# Patient Record
Sex: MALE | Race: WHITE | NOT HISPANIC OR LATINO | URBAN - METROPOLITAN AREA
[De-identification: names, ages, dates, MRNs, and addresses within clinical notes are randomized per-mention and may not be internally consistent; named-entity substitution may affect disease eponyms.]

---

## 2021-03-18 ENCOUNTER — APPOINTMENT (RX ONLY)
Dept: RURAL CLINIC 4 | Facility: CLINIC | Age: 71
Setting detail: DERMATOLOGY
End: 2021-03-18

## 2021-03-18 VITALS — TEMPERATURE: 97.1 F

## 2021-03-18 DIAGNOSIS — L57.8 OTHER SKIN CHANGES DUE TO CHRONIC EXPOSURE TO NONIONIZING RADIATION: ICD-10-CM | Status: STABLE

## 2021-03-18 DIAGNOSIS — L81.4 OTHER MELANIN HYPERPIGMENTATION: ICD-10-CM

## 2021-03-18 DIAGNOSIS — L91.8 OTHER HYPERTROPHIC DISORDERS OF THE SKIN: ICD-10-CM

## 2021-03-18 DIAGNOSIS — L30.4 ERYTHEMA INTERTRIGO: ICD-10-CM

## 2021-03-18 DIAGNOSIS — L82.1 OTHER SEBORRHEIC KERATOSIS: ICD-10-CM

## 2021-03-18 DIAGNOSIS — L82.0 INFLAMED SEBORRHEIC KERATOSIS: ICD-10-CM

## 2021-03-18 DIAGNOSIS — D22 MELANOCYTIC NEVI: ICD-10-CM

## 2021-03-18 DIAGNOSIS — D18.0 HEMANGIOMA: ICD-10-CM

## 2021-03-18 PROBLEM — D18.01 HEMANGIOMA OF SKIN AND SUBCUTANEOUS TISSUE: Status: ACTIVE | Noted: 2021-03-18

## 2021-03-18 PROBLEM — D22.5 MELANOCYTIC NEVI OF TRUNK: Status: ACTIVE | Noted: 2021-03-18

## 2021-03-18 PROCEDURE — 99204 OFFICE O/P NEW MOD 45 MIN: CPT | Mod: 25

## 2021-03-18 PROCEDURE — ? COUNSELING

## 2021-03-18 PROCEDURE — 11200 RMVL SKIN TAGS UP TO&INC 15: CPT | Mod: 59

## 2021-03-18 PROCEDURE — 17110 DESTRUCTION B9 LES UP TO 14: CPT

## 2021-03-18 PROCEDURE — ? PRESCRIPTION

## 2021-03-18 PROCEDURE — ? SKIN TAG REMOVAL

## 2021-03-18 PROCEDURE — ? PRESCRIPTION MEDICATION MANAGEMENT

## 2021-03-18 PROCEDURE — ? LIQUID NITROGEN

## 2021-03-18 PROCEDURE — ? SUNSCREEN RECOMMENDATIONS

## 2021-03-18 RX ORDER — CICLOPIROX OLAMINE 7.7 MG/G
THIN LAYER CREAM TOPICAL BID
Qty: 1 | Refills: 0 | Status: ERX | COMMUNITY
Start: 2021-03-18

## 2021-03-18 RX ADMIN — CICLOPIROX OLAMINE THIN LAYER: 7.7 CREAM TOPICAL at 00:00

## 2021-03-18 ASSESSMENT — LOCATION SIMPLE DESCRIPTION DERM
LOCATION SIMPLE: RIGHT UPPER BACK
LOCATION SIMPLE: RIGHT AXILLARY VAULT
LOCATION SIMPLE: RIGHT CLAVICULAR SKIN
LOCATION SIMPLE: LEFT FOREHEAD
LOCATION SIMPLE: LEFT FOREARM
LOCATION SIMPLE: LEFT AXILLARY VAULT
LOCATION SIMPLE: LEFT UPPER BACK
LOCATION SIMPLE: RIGHT CHEEK

## 2021-03-18 ASSESSMENT — LOCATION DETAILED DESCRIPTION DERM
LOCATION DETAILED: RIGHT CLAVICULAR SKIN
LOCATION DETAILED: LEFT PROXIMAL DORSAL FOREARM
LOCATION DETAILED: LEFT AXILLARY VAULT
LOCATION DETAILED: RIGHT AXILLARY VAULT
LOCATION DETAILED: LEFT INFERIOR MEDIAL FOREHEAD
LOCATION DETAILED: RIGHT INFERIOR CENTRAL MALAR CHEEK
LOCATION DETAILED: RIGHT MID-UPPER BACK
LOCATION DETAILED: LEFT MID-UPPER BACK
LOCATION DETAILED: RIGHT INFERIOR UPPER BACK

## 2021-03-18 ASSESSMENT — LOCATION ZONE DERM
LOCATION ZONE: AXILLAE
LOCATION ZONE: ARM
LOCATION ZONE: TRUNK
LOCATION ZONE: FACE

## 2021-03-18 NOTE — PROCEDURE: SKIN TAG REMOVAL
Medical Necessity Clause: This procedure was medically necessary because the lesions that were treated were:
Anesthesia Volume In Cc: 3
Add Associated Diagnoses If Applicable When Selecting Medical Necessity: Yes
Medical Necessity Information: It is in your best interest to select a reason for this procedure from the list below. All of these items fulfill various CMS LCD requirements except the new and changing color options.
Consent: Written consent obtained and the risks of skin tag removal was reviewed with the patient including but not limited to bleeding, pigmentary change, infection, pain, and remote possibility of scarring.
Include Z78.9 (Other Specified Conditions Influencing Health Status) As An Associated Diagnosis?: No
Detail Level: Detailed

## 2021-03-18 NOTE — PROCEDURE: LIQUID NITROGEN
Medical Necessity Clause: This procedure was medically necessary because the lesions that were treated were:  Inflamed and irritating and not healing
Detail Level: Simple
Include Z78.9 (Other Specified Conditions Influencing Health Status) As An Associated Diagnosis?: No
Aperture Size (Optional): C
Number Of Freeze-Thaw Cycles: 2 freeze-thaw cycles
Consent: The patient's consent was obtained including but not limited to risks of crusting, scabbing, blistering, scarring, darker or lighter pigmentary change, recurrence, incomplete removal and infection.
Render Post-Care Instructions In Note?: yes
Medical Necessity Information: It is in your best interest to select a reason for this procedure from the list below. All of these items fulfill various CMS LCD requirements except the new and changing color options.
Duration Of Freeze Thaw-Cycle (Seconds): 3
Post-Care Instructions: I reviewed with the patient in detail post-care instructions. Patient is to wear sunprotection, and avoid picking at any of the treated lesions. Pt may apply Rubbing alcohol to treated areas two times per day for two days.

## 2021-03-18 NOTE — PROCEDURE: PRESCRIPTION MEDICATION MANAGEMENT
Render In Strict Bullet Format?: No
Initiate Treatment: Ciclopirox to apply bid to rash until healed.
Detail Level: Zone

## 2021-04-15 ENCOUNTER — APPOINTMENT (RX ONLY)
Dept: RURAL CLINIC 4 | Facility: CLINIC | Age: 71
Setting detail: DERMATOLOGY
End: 2021-04-15

## 2021-04-15 VITALS — TEMPERATURE: 97.2 F

## 2021-04-15 DIAGNOSIS — L30.4 ERYTHEMA INTERTRIGO: ICD-10-CM

## 2021-04-15 PROCEDURE — ? PRESCRIPTION

## 2021-04-15 PROCEDURE — 99214 OFFICE O/P EST MOD 30 MIN: CPT

## 2021-04-15 PROCEDURE — ? COUNSELING

## 2021-04-15 PROCEDURE — ? PRESCRIPTION MEDICATION MANAGEMENT

## 2021-04-15 RX ORDER — HYDROCORTISONE 25 MG/G
A THIN LAYER CREAM TOPICAL BID
Qty: 1 | Refills: 1 | Status: ERX | COMMUNITY
Start: 2021-04-15

## 2021-04-15 RX ADMIN — HYDROCORTISONE A THIN LAYER: 25 CREAM TOPICAL at 00:00

## 2021-04-15 ASSESSMENT — LOCATION DETAILED DESCRIPTION DERM: LOCATION DETAILED: LEFT AXILLARY VAULT

## 2021-04-15 ASSESSMENT — LOCATION ZONE DERM: LOCATION ZONE: AXILLAE

## 2021-04-15 ASSESSMENT — LOCATION SIMPLE DESCRIPTION DERM: LOCATION SIMPLE: LEFT AXILLARY VAULT

## 2021-04-15 NOTE — PROCEDURE: PRESCRIPTION MEDICATION MANAGEMENT
Render In Strict Bullet Format?: No
Initiate Treatment: Hydrocortisone BID for 1 week.
Detail Level: Zone
Continue Regimen: Ciclopirox to apply bid to rash until healed. Cassie Coleman

## 2022-06-27 ENCOUNTER — NEW PATIENT (OUTPATIENT)
Dept: URBAN - METROPOLITAN AREA CLINIC 47 | Facility: CLINIC | Age: 72
End: 2022-06-27

## 2022-06-27 DIAGNOSIS — H25.812: ICD-10-CM

## 2022-06-27 DIAGNOSIS — H44.23: ICD-10-CM

## 2022-06-27 DIAGNOSIS — H52.7: ICD-10-CM

## 2022-06-27 DIAGNOSIS — Z96.1: ICD-10-CM

## 2022-06-27 DIAGNOSIS — Z98.890: ICD-10-CM

## 2022-06-27 PROCEDURE — 92015 DETERMINE REFRACTIVE STATE: CPT

## 2022-06-27 PROCEDURE — 92004 COMPRE OPH EXAM NEW PT 1/>: CPT

## 2022-06-27 ASSESSMENT — TONOMETRY
OD_IOP_MMHG: 16
OS_IOP_MMHG: 16

## 2022-06-27 ASSESSMENT — VISUAL ACUITY
OD_SC: <J12
OS_SC: 20/200
OS_CC: 20/25-1
OD_SC: 20/100-1
OS_SC: J2
OD_CC: 20/40+2

## 2022-07-18 ENCOUNTER — CONSULTATION/EVALUATION (OUTPATIENT)
Dept: URBAN - METROPOLITAN AREA CLINIC 43 | Facility: CLINIC | Age: 72
End: 2022-07-18

## 2022-07-18 DIAGNOSIS — H44.23: ICD-10-CM

## 2022-07-18 PROCEDURE — 99214 OFFICE O/P EST MOD 30 MIN: CPT

## 2022-07-18 PROCEDURE — 92250 FUNDUS PHOTOGRAPHY W/I&R: CPT

## 2022-07-18 ASSESSMENT — TONOMETRY
OS_IOP_MMHG: 18
OD_IOP_MMHG: 15

## 2022-07-18 ASSESSMENT — VISUAL ACUITY
OD_CC: 20/50-1
OS_CC: 20/70-1

## 2022-12-19 ENCOUNTER — PREPPED CHART (OUTPATIENT)
Dept: URBAN - METROPOLITAN AREA CLINIC 47 | Facility: CLINIC | Age: 72
End: 2022-12-19

## 2023-01-17 ENCOUNTER — COMPREHENSIVE EXAM (OUTPATIENT)
Dept: URBAN - METROPOLITAN AREA CLINIC 47 | Facility: CLINIC | Age: 73
End: 2023-01-17

## 2023-01-17 DIAGNOSIS — H44.23: ICD-10-CM

## 2023-01-17 DIAGNOSIS — H43.393: ICD-10-CM

## 2023-01-17 DIAGNOSIS — H35.363: ICD-10-CM

## 2023-01-17 DIAGNOSIS — Z96.1: ICD-10-CM

## 2023-01-17 DIAGNOSIS — H25.812: ICD-10-CM

## 2023-01-17 DIAGNOSIS — H43.813: ICD-10-CM

## 2023-01-17 DIAGNOSIS — H35.30: ICD-10-CM

## 2023-01-17 PROCEDURE — 92014 COMPRE OPH EXAM EST PT 1/>: CPT

## 2023-01-17 ASSESSMENT — VISUAL ACUITY
OS_BAT: 20/50
OS_CC: 20/30
OD_BAT: 20/150
OS_SC: J2
OS_CC: J5
OS_SC: 20/400
OD_SC: 20/100-
OD_CC: <J12
OD_CC: 20/30
OD_SC: J10

## 2023-01-17 ASSESSMENT — TONOMETRY
OS_IOP_MMHG: 18
OD_IOP_MMHG: 16

## 2023-05-26 ENCOUNTER — TECH ONLY (OUTPATIENT)
Dept: URBAN - METROPOLITAN AREA CLINIC 39 | Facility: CLINIC | Age: 73
End: 2023-05-26

## 2023-05-26 ENCOUNTER — SURGERY/PROCEDURE (OUTPATIENT)
Dept: URBAN - METROPOLITAN AREA CLINIC 43 | Facility: CLINIC | Age: 73
End: 2023-05-26

## 2023-05-26 ENCOUNTER — PRE-OP/H&P (OUTPATIENT)
Dept: URBAN - METROPOLITAN AREA SURGERY 14 | Facility: SURGERY | Age: 73
End: 2023-05-26

## 2023-05-26 DIAGNOSIS — Z96.1: ICD-10-CM

## 2023-05-26 DIAGNOSIS — H35.30: ICD-10-CM

## 2023-05-26 DIAGNOSIS — H44.23: ICD-10-CM

## 2023-05-26 DIAGNOSIS — Z98.890: ICD-10-CM

## 2023-05-26 DIAGNOSIS — H25.812: ICD-10-CM

## 2023-05-26 DIAGNOSIS — H01.006: ICD-10-CM

## 2023-05-26 DIAGNOSIS — H04.123: ICD-10-CM

## 2023-05-26 DIAGNOSIS — H43.813: ICD-10-CM

## 2023-05-26 DIAGNOSIS — H43.393: ICD-10-CM

## 2023-05-26 DIAGNOSIS — H35.363: ICD-10-CM

## 2023-05-26 DIAGNOSIS — H01.003: ICD-10-CM

## 2023-05-26 PROCEDURE — 99211T TECH SERVICE

## 2023-05-26 PROCEDURE — 99211HP H&P OFFICE/OUTPATIENT VISIT, EST

## 2023-05-26 PROCEDURE — 66984 XCAPSL CTRC RMVL W/O ECP: CPT

## 2023-05-26 ASSESSMENT — TONOMETRY: OS_IOP_MMHG: 15

## 2023-05-26 ASSESSMENT — VISUAL ACUITY
OS_SC: CF 6FT
OS_SC: J4

## 2023-05-30 ENCOUNTER — POST-OP (OUTPATIENT)
Dept: URBAN - METROPOLITAN AREA CLINIC 45 | Facility: CLINIC | Age: 73
End: 2023-05-30

## 2023-05-30 DIAGNOSIS — Z96.1: ICD-10-CM

## 2023-05-30 ASSESSMENT — VISUAL ACUITY
OS_SC: 20/100
OS_SC: J1

## 2023-05-30 ASSESSMENT — TONOMETRY: OS_IOP_MMHG: 24

## 2023-06-15 ENCOUNTER — POST-OP (OUTPATIENT)
Dept: URBAN - METROPOLITAN AREA CLINIC 47 | Facility: CLINIC | Age: 73
End: 2023-06-15

## 2023-06-15 DIAGNOSIS — H04.123: ICD-10-CM

## 2023-06-15 DIAGNOSIS — Z96.1: ICD-10-CM

## 2023-06-15 PROCEDURE — 99024 POSTOP FOLLOW-UP VISIT: CPT

## 2023-06-15 ASSESSMENT — VISUAL ACUITY
OS_SC: 20/80-
OS_SC: J1
OD_SC: 20/100-
OU_SC: J1
OU_SC: 20/80-
OD_SC: <J12

## 2023-06-15 ASSESSMENT — TONOMETRY
OS_IOP_MMHG: 18
OD_IOP_MMHG: 16

## 2023-10-17 ENCOUNTER — FOLLOW UP (OUTPATIENT)
Dept: URBAN - METROPOLITAN AREA CLINIC 47 | Facility: CLINIC | Age: 73
End: 2023-10-17

## 2023-10-17 DIAGNOSIS — H04.123: ICD-10-CM

## 2023-10-17 DIAGNOSIS — Z96.1: ICD-10-CM

## 2023-10-17 DIAGNOSIS — H26.492: ICD-10-CM

## 2023-10-17 DIAGNOSIS — H01.026: ICD-10-CM

## 2023-10-17 DIAGNOSIS — H01.023: ICD-10-CM

## 2023-10-17 DIAGNOSIS — H44.23: ICD-10-CM

## 2023-10-17 PROCEDURE — 99213 OFFICE O/P EST LOW 20 MIN: CPT

## 2023-10-17 PROCEDURE — 92134 CPTRZ OPH DX IMG PST SGM RTA: CPT

## 2023-10-17 ASSESSMENT — VISUAL ACUITY
OD_BAT: 20/40
OU_CC: 20/20
OS_BAT: 20/25
OS_CC: 20/20
OD_CC: 20/30

## 2023-10-17 ASSESSMENT — TONOMETRY
OS_IOP_MMHG: 15
OD_IOP_MMHG: 18

## 2023-10-24 ENCOUNTER — CONSULTATION/EVALUATION (OUTPATIENT)
Dept: URBAN - METROPOLITAN AREA CLINIC 36 | Facility: CLINIC | Age: 73
End: 2023-10-24

## 2023-10-24 DIAGNOSIS — H01.026: ICD-10-CM

## 2023-10-24 DIAGNOSIS — Z96.1: ICD-10-CM

## 2023-10-24 DIAGNOSIS — H26.492: ICD-10-CM

## 2023-10-24 DIAGNOSIS — H01.023: ICD-10-CM

## 2023-10-24 DIAGNOSIS — H44.23: ICD-10-CM

## 2023-10-24 DIAGNOSIS — H04.123: ICD-10-CM

## 2023-10-24 PROCEDURE — 66821 AFTER CATARACT LASER SURGERY: CPT

## 2023-10-24 PROCEDURE — 99214 OFFICE O/P EST MOD 30 MIN: CPT | Mod: 57

## 2023-10-24 ASSESSMENT — VISUAL ACUITY
OS_CC: 20/30-1
OD_PH: 20/50
OS_PH: 20/40
OD_CC: 20/40

## 2023-10-24 ASSESSMENT — TONOMETRY
OS_IOP_MMHG: 16
OD_IOP_MMHG: 17

## 2023-10-31 ENCOUNTER — POST-OP (OUTPATIENT)
Dept: URBAN - METROPOLITAN AREA CLINIC 47 | Facility: CLINIC | Age: 73
End: 2023-10-31

## 2023-10-31 DIAGNOSIS — Z98.890: ICD-10-CM

## 2023-10-31 PROCEDURE — 99024 POSTOP FOLLOW-UP VISIT: CPT

## 2023-10-31 ASSESSMENT — VISUAL ACUITY
OS_CC: 20/20
OS_SC: 20/60
OD_CC: 20/30
OD_SC: 20/100

## 2023-10-31 ASSESSMENT — TONOMETRY
OD_IOP_MMHG: 16
OS_IOP_MMHG: 16

## 2025-08-06 ENCOUNTER — COMPREHENSIVE EXAM (OUTPATIENT)
Age: 75
End: 2025-08-06

## 2025-08-06 DIAGNOSIS — H01.02B: ICD-10-CM

## 2025-08-06 DIAGNOSIS — H01.02A: ICD-10-CM

## 2025-08-06 DIAGNOSIS — Z96.1: ICD-10-CM

## 2025-08-06 DIAGNOSIS — H04.123: ICD-10-CM

## 2025-08-06 DIAGNOSIS — H44.23: ICD-10-CM

## 2025-08-06 PROCEDURE — 99214 OFFICE O/P EST MOD 30 MIN: CPT | Mod: 25

## 2025-08-06 PROCEDURE — A4262 TEMPORARY TEAR DUCT PLUG: HCPCS

## 2025-08-06 PROCEDURE — 68761C PUNCTUM PLUG /COLLAGEN, EACH: Mod: E2,E4

## 2025-08-06 RX ORDER — PERFLUOROHEXYLOCTANE 1 MG/MG: 1 SOLUTION OPHTHALMIC
